# Patient Record
Sex: FEMALE | Race: WHITE | NOT HISPANIC OR LATINO | URBAN - METROPOLITAN AREA
[De-identification: names, ages, dates, MRNs, and addresses within clinical notes are randomized per-mention and may not be internally consistent; named-entity substitution may affect disease eponyms.]

---

## 2017-08-14 ENCOUNTER — ALLIED HEALTH/NURSE VISIT (OUTPATIENT)
Dept: FAMILY MEDICINE | Facility: CLINIC | Age: 37
End: 2017-08-14
Payer: COMMERCIAL

## 2017-08-14 DIAGNOSIS — Z23 ENCOUNTER FOR IMMUNIZATION: Primary | ICD-10-CM

## 2017-08-14 PROCEDURE — 99207 ZZC NO CHARGE NURSE ONLY: CPT

## 2017-08-14 PROCEDURE — 90715 TDAP VACCINE 7 YRS/> IM: CPT

## 2017-08-14 PROCEDURE — 90471 IMMUNIZATION ADMIN: CPT

## 2017-08-14 NOTE — MR AVS SNAPSHOT
After Visit Summary   8/14/2017    Marisela Frausto    MRN: [id number]           Patient Information     Date Of Birth          1980        Visit Information        Provider Department      8/14/2017 4:00 PM [facility]        Today's Diagnoses     Encounter for immunization    -  1       Follow-ups after your visit        Who to contact     Normal or non-critical lab and imaging results will be communicated to you by Tellybeanhart, letter or phone within 4 business days after the clinic has received the results. If you do not hear from us within 7 days, please contact the clinic through Tellybeanhart or phone. If you have a critical or abnormal lab result, we will notify you by phone as soon as possible.  Submit refill requests through VISUALPLANT or call your pharmacy and they will forward the refill request to us. Please allow 3 business days for your refill to be completed.          If you need to speak with a  for additional information , please call: [phone]           Additional Information About Your Visit        TellybeanharWishGenie Information     VISUALPLANT gives you secure access to your electronic health record. If you see a primary care provider, you can also send messages to your care team and make appointments. If you have questions, please call your primary care clinic.  If you do not have a primary care provider, please call [phone] and they will assist you.        Care EveryWhere ID     This is your Care EveryWhere ID. This could be used by other organizations to access your [facility] medical records  [id number]         Blood Pressure from Last 3 Encounters:   07/07/13 131/81   06/27/13 140/90   06/14/13 121/79    Weight from Last 3 Encounters:   06/30/16 239 lb (108.4 kg)   05/26/16 239 lb (108.4 kg)   05/05/16 239 lb (108.4 kg)              We Performed the Following     TDAP (ADACEL)     VACCINE ADMINISTRATION, INITIAL        Primary Care Provider  Office Phone # Fax #    Arin Hunt,  [phone] [address] [place] [address]        Equal Access to Services     DIANA MELTON : Hadii aad ku hadkendalzahida Sokar, wabulmaroda luqadaha, qachaddta kaalmada adeesauda, holland princessin hayaagiancarlo martellartem guerriermiracle paulino. So [facility] [phone].    ATENCIÓN: Si habla español, tiene a torres disposición servicios gratuitos de asistencia lingüística. Llame al [phone].    We comply with applicable federal civil rights laws and [place] laws. We do not discriminate on the basis of race, color, national origin, age, disability sex, sexual orientation or gender identity.            Thank you!     [facility]. Our goal is always to provide you with excellent care. Hearing back from our patients is one way we can continue to improve our services. Please take a few minutes to complete the written survey that you may receive in the mail after your visit with us. Thank you!             Your Updated Medication List - Protect others around you: Learn how to safely use, store and throw away your medicines at www.disposemymeds.org.      Notice  As of 8/14/2017  4:07 PM    You have not been prescribed any medications.

## 2017-08-14 NOTE — PROGRESS NOTES
Screening Questionnaire for Adult Immunization    Are you sick today?   No   Do you have allergies to medications, food, a vaccine component or latex?   No   Have you ever had a serious reaction after receiving a vaccination?   No   Do you have a long-term health problem with heart disease, lung disease, asthma, kidney disease, metabolic disease (e.g. diabetes), anemia, or other blood disorder?   No   Do you have cancer, leukemia, HIV/AIDS, or any other immune system problem?   No   In the past 3 months, have you taken medications that affect  your immune system, such as prednisone, other steroids, or anticancer drugs; drugs for the treatment of rheumatoid arthritis, Crohn s disease, or psoriasis; or have you had radiation treatments?   No   Have you had a seizure, or a brain or other nervous system problem?   No   During the past year, have you received a transfusion of blood or blood     products, or been given immune (gamma) globulin or antiviral drug?   No   For women: Are you pregnant or is there a chance you could become        pregnant during the next month?   No   Have you received any vaccinations in the past 4 weeks?   No     Immunization questionnaire answers were all negative.      MNVFC doesn't apply on this patient    Per orders of Dr. Arin Hunt, injection of Adacel given by CARO DELGADO. Patient instructed to remain in clinic for 15 minutes afterwards, and to report any adverse reaction to me immediately.       Screening performed by CARO DELGADO on 8/14/2017 at 4:06 PM.

## 2017-09-10 ENCOUNTER — HEALTH MAINTENANCE LETTER (OUTPATIENT)
Age: 37
End: 2017-09-10

## 2019-11-08 ENCOUNTER — HEALTH MAINTENANCE LETTER (OUTPATIENT)
Age: 39
End: 2019-11-08

## 2020-02-23 ENCOUNTER — HEALTH MAINTENANCE LETTER (OUTPATIENT)
Age: 40
End: 2020-02-23

## 2020-12-06 ENCOUNTER — HEALTH MAINTENANCE LETTER (OUTPATIENT)
Age: 40
End: 2020-12-06

## 2021-04-16 ENCOUNTER — VIRTUAL VISIT (OUTPATIENT)
Dept: FAMILY MEDICINE | Facility: CLINIC | Age: 41
End: 2021-04-16
Payer: COMMERCIAL

## 2021-04-16 DIAGNOSIS — F41.9 ANXIETY: ICD-10-CM

## 2021-04-16 DIAGNOSIS — E66.3 OVERWEIGHT: Primary | ICD-10-CM

## 2021-04-16 PROCEDURE — 99203 OFFICE O/P NEW LOW 30 MIN: CPT | Mod: 95 | Performed by: NURSE PRACTITIONER

## 2021-04-16 NOTE — PROGRESS NOTES
Marisela is a 41 year old who is being evaluated via a billable video visit.      How would you like to obtain your AVS? MyChart  If the video visit is dropped, the invitation should be resent by: Text to cell phone: [phone]  Will anyone else be joining your video visit? No    Video Start Time: 9:26 AM    Assessment & Plan     Overweight  Patient to trial MyPlate method of eating.  I encouraged lean protein, mostly fish, chicken and plant based protein, fruits, vegetable and whole grains.  Patient to work on portions and eating less processed food.  Patient to continue her exercise regimen.    Anxiety  Patient to continue to monitor.  We discussed deep breathing exercises, Keith Chi, distraction.                   Return in about 3 months (around 7/16/2021) for Physical Exam.    JERMAINE Jin CNP  [facility]    Marlyn Antonio is a 41 year old who presents for the following health issues     HPI   Chief Complaint   Patient presents with     Weight Problem     discuss weight loss     Stress     discuss stress management.      Patient has struggled with weight her whole life, worse since pregnancy.  Her father is overweight.  Patient is not on any medications.  Patient has cut out carbs with success in the past.  HIIT workouts have also helped.  Patient is working on portion control, increasing produce, drinking more water.  Patient is exercising 45 minutes 4 days per week with cycling, rowing, strength training.    Patient notes stress since the beginning of the pandemic.  She lost her job initially and was able to get a new one.  She was having racing heart beat and it resolved, but then she wasn't able to get a vaccine and it started again.  Symptoms stopped after getting her vaccine.  She notes that she was having racing thoughts at about the same time.      Review of Systems   Constitutional, HEENT, cardiovascular, pulmonary, gi and gu systems are negative, except as  otherwise noted.      Objective           Vitals:  No vitals were obtained today due to virtual visit.    Physical Exam   GENERAL: Healthy, alert and no distress  EYES: Eyes grossly normal to inspection.  No discharge or erythema, or obvious scleral/conjunctival abnormalities.  RESP: No audible wheeze, cough, or visible cyanosis.  No visible retractions or increased work of breathing.    SKIN: Visible skin clear. No significant rash, abnormal pigmentation or lesions.  NEURO: Cranial nerves grossly intact.  Mentation and speech appropriate for age.  PSYCH: Mentation appears normal, affect normal/bright, judgement and insight intact, normal speech and appearance well-groomed.                Video-Visit Details    Type of service:  Video Visit    Video End Time:9:46 AM    Originating Location (pt. Location): Home    Distant Location (provider location):  [facility]     Platform used for Video Visit: Mophie

## 2021-09-25 ENCOUNTER — HEALTH MAINTENANCE LETTER (OUTPATIENT)
Age: 41
End: 2021-09-25

## 2022-01-15 ENCOUNTER — HEALTH MAINTENANCE LETTER (OUTPATIENT)
Age: 42
End: 2022-01-15

## 2022-01-17 ENCOUNTER — OFFICE VISIT (OUTPATIENT)
Dept: PODIATRY | Facility: CLINIC | Age: 42
End: 2022-01-17
Payer: COMMERCIAL

## 2022-01-17 VITALS
BODY MASS INDEX: 41.66 KG/M2 | HEART RATE: 80 BPM | SYSTOLIC BLOOD PRESSURE: 164 MMHG | DIASTOLIC BLOOD PRESSURE: 100 MMHG | WEIGHT: 270 LBS

## 2022-01-17 DIAGNOSIS — B07.0 VERRUCA PLANTARIS: Primary | ICD-10-CM

## 2022-01-17 PROCEDURE — 17110 DESTRUCTION B9 LES UP TO 14: CPT | Performed by: PODIATRIST

## 2022-01-17 PROCEDURE — 99213 OFFICE O/P EST LOW 20 MIN: CPT | Mod: 25 | Performed by: PODIATRIST

## 2022-01-17 NOTE — PROGRESS NOTES
Assessment:      ICD-10-CM    1. Verruca plantaris  B07.0 DESTRUCT BENIGN LESION, UP TO 14        Plan:  No orders of the defined types were placed in this encounter.      Discussed the etiology and treatment of the condition with the patient.  Discussed treatment options.  -Chemotherapy- Cantharone chemical vessicant discussed - we are out currently.    -Sharp debridement of hyperkeratotic lesion(s) to fibrotic base.  -Cryotherapy- liquid Nitrogen applied to lesion(s).  -Dressing- moleskin, Coban.  Keep dressing dry, intact x48h.  Then daily topical acid under moleskin occlusion x2 weeks.    Follow-up with Dr. Mcneill or Dermatology if it recurs    Return:  No follow-ups on file.     Jeri Alvarado DPM    Chief Complaint:     Patient presents with:  Consult: planter wart Left foot     left wart    HPI:  Marisela Frausto is a 41 year old year old female who presents for evaluation of wart.    Lesion location- plantar forefoot  Duration- present for months.  Prior episodes? Yes: 2017.  Prior treatments- liquid nitrogen.    Dr. Mcneill had her on oral medications for the wart also & she said it completely went away.    Past Medical & Surgical History:  Past Medical History:   Diagnosis Date     Chickenpox      Presence of intrauterine contraceptive device 9/27/07    Mirena; removed 2010      Past Surgical History:   Procedure Laterality Date     NO HISTORY OF SURGERY        Family History   Problem Relation Age of Onset     Diabetes Paternal Grandmother      Hypertension Paternal Grandmother      Diabetes Paternal Uncle         juvenile diabetes     Thyroid Disease Mother      Thyroid Disease Maternal Grandmother      Cerebrovascular Disease No family hx of      Breast Cancer No family hx of      Cancer - colorectal No family hx of      Cardiovascular Maternal Grandfather         MI        Social History:  ?  History   Smoking Status     Never Smoker   Smokeless Tobacco     Never Used     History   Drug Use No      Social History    Substance and Sexual Activity      Alcohol use: No        Comment: 1 drink per week- quit with pregnancy      Allergies:  ?   No Known Allergies     Medications:    No current outpatient medications on file.     No current facility-administered medications for this visit.       Physical Exam:  ?  Vitals:  BP (!) 164/100   Pulse 80   Wt 122.5 kg (270 lb)   BMI 41.66 kg/m     General:  WD/WN, in NAD.  A&O x3.  Dermatologic:    Hyperkeratotic lesion(s) located plantar forefoot.  Post debridement, fibrotic base present, petechial bleeding  present, loss of skin lines  present.   Hyperkeratotic nucleated core  absent.   Skin texture, turgor is normal.  Vascular:  Pulses palpable bilateral.  Digital capillary refill time normal bilateral.  Skin temperature is normal bilateral.  Neurologic:    Gross sensation normal.  Gait and balance normal.  Musculoskeletal:  No pain to palpation of foot & ankle  aROM intact to foot & ankle  Muscle strength 5/5 foot & ankle

## 2022-01-17 NOTE — PATIENT INSTRUCTIONS
PATIENT INSTRUCTIONS      If more  advanced treatment for wart is desired, please request a dermatology referral.      LASER WART TREATMENT  [facility]   [phone] [facility]  [facility] [phone] [facility]   [facility]   [phone]   [facility]  [phone] [facility]   [facility]  [phone] Please call one of these contacts if you're interested in this treatment.

## 2022-01-17 NOTE — LETTER
1/17/2022         RE: Marisela Frausto  [address]  [place] [address]        Dear Colleague,    Thank you for referring your patient, Marisela Frausto, to the [facility]. Please see a copy of my visit note below.    Assessment:      ICD-10-CM    1. Verruca plantaris  B07.0 DESTRUCT BENIGN LESION, UP TO 14        Plan:  No orders of the defined types were placed in this encounter.      Discussed the etiology and treatment of the condition with the patient.  Discussed treatment options.  -Chemotherapy- Cantharone chemical vessicant discussed - we are out currently.    -Sharp debridement of hyperkeratotic lesion(s) to fibrotic base.  -Cryotherapy- liquid Nitrogen applied to lesion(s).  -Dressing- moleskin, Coban.  Keep dressing dry, intact x48h.  Then daily topical acid under moleskin occlusion x2 weeks.    Follow-up with Dr. Mcneill or Dermatology if it recurs    Return:  No follow-ups on file.     Jeri Alvarado DPM    Chief Complaint:     Patient presents with:  Consult: planter wart Left foot     left wart    HPI:  Marisela Frausto is a 41 year old year old female who presents for evaluation of wart.    Lesion location- plantar forefoot  Duration- present for months.  Prior episodes? Yes: 2017.  Prior treatments- liquid nitrogen.    Dr. Mcneill had her on oral medications for the wart also & she said it completely went away.    Past Medical & Surgical History:  Past Medical History:   Diagnosis Date     Chickenpox      Presence of intrauterine contraceptive device 9/27/07    Mirena; removed 2010      Past Surgical History:   Procedure Laterality Date     NO HISTORY OF SURGERY        Family History   Problem Relation Age of Onset     Diabetes Paternal Grandmother      Hypertension Paternal Grandmother      Diabetes Paternal Uncle         juvenile diabetes     Thyroid Disease Mother      Thyroid Disease Maternal Grandmother      Cerebrovascular Disease No family hx of       Breast Cancer No family hx of      Cancer - colorectal No family hx of      Cardiovascular Maternal Grandfather         MI        Social History:  ?  History   Smoking Status     Never Smoker   Smokeless Tobacco     Never Used     History   Drug Use No     Social History    Substance and Sexual Activity      Alcohol use: No        Comment: 1 drink per week- quit with pregnancy      Allergies:  ?   No Known Allergies     Medications:    No current outpatient medications on file.     No current facility-administered medications for this visit.       Physical Exam:  ?  Vitals:  BP (!) 164/100   Pulse 80   Wt 122.5 kg (270 lb)   BMI 41.66 kg/m     General:  WD/WN, in NAD.  A&O x3.  Dermatologic:    Hyperkeratotic lesion(s) located plantar forefoot.  Post debridement, fibrotic base present, petechial bleeding  present, loss of skin lines  present.   Hyperkeratotic nucleated core  absent.   Skin texture, turgor is normal.  Vascular:  Pulses palpable bilateral.  Digital capillary refill time normal bilateral.  Skin temperature is normal bilateral.  Neurologic:    Gross sensation normal.  Gait and balance normal.  Musculoskeletal:  No pain to palpation of foot & ankle  aROM intact to foot & ankle  Muscle strength 5/5 foot & ankle                  Again, thank you for allowing me to participate in the care of your patient.        Sincerely,        Jeri Alvarado DPM

## 2022-04-27 ENCOUNTER — MYC MEDICAL ADVICE (OUTPATIENT)
Dept: FAMILY MEDICINE | Facility: CLINIC | Age: 42
End: 2022-04-27
Payer: COMMERCIAL

## 2022-04-27 DIAGNOSIS — E66.01 CLASS 3 SEVERE OBESITY WITHOUT SERIOUS COMORBIDITY IN ADULT, UNSPECIFIED BMI, UNSPECIFIED OBESITY TYPE (H): Primary | ICD-10-CM

## 2022-04-27 DIAGNOSIS — E66.813 CLASS 3 SEVERE OBESITY WITHOUT SERIOUS COMORBIDITY IN ADULT, UNSPECIFIED BMI, UNSPECIFIED OBESITY TYPE (H): Primary | ICD-10-CM

## 2023-01-07 ENCOUNTER — HEALTH MAINTENANCE LETTER (OUTPATIENT)
Age: 43
End: 2023-01-07

## 2023-04-22 ENCOUNTER — HEALTH MAINTENANCE LETTER (OUTPATIENT)
Age: 43
End: 2023-04-22

## 2024-02-10 ENCOUNTER — HEALTH MAINTENANCE LETTER (OUTPATIENT)
Age: 44
End: 2024-02-10

## 2024-06-29 ENCOUNTER — HEALTH MAINTENANCE LETTER (OUTPATIENT)
Age: 44
End: 2024-06-29

## 2025-07-14 ENCOUNTER — RESULTS FOLLOW-UP (OUTPATIENT)
Dept: FAMILY MEDICINE | Facility: CLINIC | Age: 45
End: 2025-07-14
Payer: COMMERCIAL

## 2025-07-14 ENCOUNTER — PATIENT OUTREACH (OUTPATIENT)
Dept: CARE COORDINATION | Facility: CLINIC | Age: 45
End: 2025-07-14
Payer: COMMERCIAL

## 2025-07-14 DIAGNOSIS — Z12.31 VISIT FOR SCREENING MAMMOGRAM: ICD-10-CM

## 2025-07-14 DIAGNOSIS — R79.9 ABNORMAL BLOOD CHEMISTRY: Primary | ICD-10-CM

## 2025-07-14 DIAGNOSIS — I10 PRIMARY HYPERTENSION: ICD-10-CM

## 2025-07-14 NOTE — RESULT ENCOUNTER NOTE
The 10-year ASCVD risk score (Kalin CHRISTIAN, et al., 2019) is: 1%    Values used to calculate the score:      Age: 45 years      Sex: Female      Is Non- : No      Diabetic: No      Tobacco smoker: No      Systolic Blood Pressure: 154 mmHg      Is BP treated: No      HDL Cholesterol: 50 mg/dL      Total Cholesterol: 186 mg/dL    Maxine,     Your cholesterol is a little high, but in the same range as the past.   Your kidney function was also a little low, but this can be from slight dehydration if you were fasting. I would have you make a lab only appointment in 4-8 weeks and just make sure you are well hydrated at that time. You do not need to fast for that lab.   The rest of the labs are normal.   Jackeline Palmer PA-C

## 2025-07-24 RX ORDER — LISINOPRIL 10 MG/1
10 TABLET ORAL DAILY
Qty: 90 TABLET | Refills: 0 | Status: SHIPPED | OUTPATIENT
Start: 2025-07-24

## 2025-07-28 ENCOUNTER — TELEPHONE (OUTPATIENT)
Dept: FAMILY MEDICINE | Facility: CLINIC | Age: 45
End: 2025-07-28
Payer: COMMERCIAL